# Patient Record
Sex: FEMALE | Race: WHITE | Employment: PART TIME | URBAN - METROPOLITAN AREA
[De-identification: names, ages, dates, MRNs, and addresses within clinical notes are randomized per-mention and may not be internally consistent; named-entity substitution may affect disease eponyms.]

---

## 2018-11-23 ENCOUNTER — OFFICE VISIT (OUTPATIENT)
Dept: INTERNAL MEDICINE | Age: 38
End: 2018-11-23
Payer: COMMERCIAL

## 2018-11-23 VITALS
DIASTOLIC BLOOD PRESSURE: 70 MMHG | SYSTOLIC BLOOD PRESSURE: 98 MMHG | WEIGHT: 132.6 LBS | BODY MASS INDEX: 23.5 KG/M2 | HEIGHT: 63 IN

## 2018-11-23 DIAGNOSIS — R10.9 ABDOMINAL PRESSURE: ICD-10-CM

## 2018-11-23 DIAGNOSIS — K59.00 CONSTIPATION, UNSPECIFIED CONSTIPATION TYPE: Primary | ICD-10-CM

## 2018-11-23 LAB
BILIRUBIN, POC: NORMAL
BLOOD URINE, POC: NORMAL
CLARITY, POC: NORMAL
COLOR, POC: YELLOW
GLUCOSE URINE, POC: NORMAL
KETONES, POC: NORMAL
LEUKOCYTE EST, POC: NORMAL
NITRITE, POC: NORMAL
PH, POC: NORMAL
PROTEIN, POC: NORMAL
SPECIFIC GRAVITY, POC: NORMAL
UROBILINOGEN, POC: NORMAL

## 2018-11-23 PROCEDURE — 81002 URINALYSIS NONAUTO W/O SCOPE: CPT | Performed by: NURSE PRACTITIONER

## 2018-11-23 PROCEDURE — 99213 OFFICE O/P EST LOW 20 MIN: CPT | Performed by: NURSE PRACTITIONER

## 2018-11-23 ASSESSMENT — PATIENT HEALTH QUESTIONNAIRE - PHQ9
SUM OF ALL RESPONSES TO PHQ QUESTIONS 1-9: 0
SUM OF ALL RESPONSES TO PHQ QUESTIONS 1-9: 0
1. LITTLE INTEREST OR PLEASURE IN DOING THINGS: 0
SUM OF ALL RESPONSES TO PHQ9 QUESTIONS 1 & 2: 0
2. FEELING DOWN, DEPRESSED OR HOPELESS: 0

## 2018-11-23 ASSESSMENT — ENCOUNTER SYMPTOMS
RESPIRATORY NEGATIVE: 1
ABDOMINAL PAIN: 1
NAUSEA: 1
EYES NEGATIVE: 1
DIARRHEA: 1

## 2018-11-23 NOTE — PROGRESS NOTES
Subjective:      Patient ID: Cierra Galvez is a 30 y.o. female who presents today for:  Chief Complaint   Patient presents with    Abdominal Pain     x 2 weeks, no vomitng, very nauseous, stomach pains, states in the beginning she was going to the bathroom a lot and now she cannot go dane ll, she states her stomach is very boated, states she tried a laxative and it did not help at all       - Patient tried a Suppository at home - had a small bm after, not very hard  - Before today last bm was yesterday, very small amounts  - Some mucous today in stool  - has also tried colace with minimal relief      Abdominal Pain   This is a new problem. The current episode started 1 to 4 weeks ago. Associated symptoms include diarrhea (was in the beggining) and nausea. Pertinent negatives include no fever. Constipation: now. Past Medical History:   Diagnosis Date    Asthma     seasonal     Past Surgical History:   Procedure Laterality Date    WISDOM TOOTH EXTRACTION       Social History     Social History    Marital status:      Spouse name: N/A    Number of children: N/A    Years of education: N/A     Occupational History    Not on file.      Social History Main Topics    Smoking status: Never Smoker    Smokeless tobacco: Never Used    Alcohol use Yes      Comment: rare    Drug use: Unknown    Sexual activity: Not on file     Other Topics Concern    Not on file     Social History Narrative    No narrative on file     Family History   Problem Relation Age of Onset    Asthma Mother     Other Mother         obese    High Blood Pressure Father     High Cholesterol Father     Cancer Maternal Uncle         brain    High Blood Pressure Maternal Grandfather     Cancer Paternal Grandfather         lung smoker     Allergies   Allergen Reactions    Cephalosporins     Cifenline     Shellfish-Derived Products      Current Outpatient Prescriptions on File Prior to Visit   Medication Sig Dispense Refill   

## 2018-11-25 LAB — URINE CULTURE, ROUTINE: NORMAL

## 2024-11-22 ENCOUNTER — TELEPHONE (OUTPATIENT)
Dept: INFECTIOUS DISEASES | Age: 44
End: 2024-11-22

## 2024-11-25 ENCOUNTER — OFFICE VISIT (OUTPATIENT)
Dept: INFECTIOUS DISEASES | Age: 44
End: 2024-11-25
Payer: COMMERCIAL

## 2024-11-25 VITALS
SYSTOLIC BLOOD PRESSURE: 109 MMHG | RESPIRATION RATE: 18 BRPM | WEIGHT: 142.2 LBS | TEMPERATURE: 98.2 F | DIASTOLIC BLOOD PRESSURE: 68 MMHG | BODY MASS INDEX: 25.2 KG/M2 | OXYGEN SATURATION: 99 % | HEART RATE: 62 BPM | HEIGHT: 63 IN

## 2024-11-25 DIAGNOSIS — R76.11 HISTORY OF POSITIVE PPD, UNTREATED: Primary | ICD-10-CM

## 2024-11-25 PROCEDURE — 99203 OFFICE O/P NEW LOW 30 MIN: CPT | Performed by: INTERNAL MEDICINE

## 2024-11-25 ASSESSMENT — PATIENT HEALTH QUESTIONNAIRE - PHQ9
SUM OF ALL RESPONSES TO PHQ QUESTIONS 1-9: 0
SUM OF ALL RESPONSES TO PHQ QUESTIONS 1-9: 0
SUM OF ALL RESPONSES TO PHQ9 QUESTIONS 1 & 2: 0
1. LITTLE INTEREST OR PLEASURE IN DOING THINGS: NOT AT ALL
SUM OF ALL RESPONSES TO PHQ QUESTIONS 1-9: 0
SUM OF ALL RESPONSES TO PHQ QUESTIONS 1-9: 0
2. FEELING DOWN, DEPRESSED OR HOPELESS: NOT AT ALL

## 2024-11-25 ASSESSMENT — ENCOUNTER SYMPTOMS
GASTROINTESTINAL NEGATIVE: 1
RESPIRATORY NEGATIVE: 1

## 2024-11-25 NOTE — PROGRESS NOTES
tablet by mouth daily (Patient not taking: Reported on 11/25/2024)      Flaxseed, Linseed, (FLAX SEEDS PO) Take by mouth (Patient not taking: Reported on 11/25/2024)      multivitamin (THERAGRAN) per tablet Take 1 tablet by mouth daily.   (Patient not taking: Reported on 11/25/2024)       No current facility-administered medications on file prior to visit.       Allergies   Allergen Reactions    Other Anaphylaxis     Reaction Unknown: please investigate and enter into EPIC.    Cefprozil Nausea Only    Cephalosporins     Cifenline     Shellfish Allergy Swelling     angioedema    Shellfish-Derived Products     Cephalexin Nausea And Vomiting     Reaction Unknown: please investigate and enter into EPIC.    Cephalosporins Nausea And Vomiting         Family History   Problem Relation Age of Onset    Asthma Mother     High Blood Pressure Father     High Cholesterol Father     Other Mother         obese    Cancer Maternal Uncle         brain    High Blood Pressure Maternal Grandfather     Cancer Paternal Grandfather         lung smoker         Physical Exam:      Physical Exam  Constitutional:       Appearance: She is not ill-appearing or diaphoretic.   Cardiovascular:      Heart sounds: Normal heart sounds. No murmur heard.  Pulmonary:      Effort: Pulmonary effort is normal. No respiratory distress.      Breath sounds: No wheezing, rhonchi or rales.   Abdominal:      General: Abdomen is flat. Bowel sounds are normal. There is no distension.      Palpations: Abdomen is soft. There is no mass.      Tenderness: There is no abdominal tenderness. There is no guarding.         Blood pressure 109/68, pulse 62, temperature 98.2 °F (36.8 °C), temperature source Temporal, resp. rate 18, height 1.6 m (5' 3\"), weight 64.5 kg (142 lb 3.2 oz), SpO2 99%, currently breastfeeding.      .   No results found for: \"WBC\", \"HGB\", \"HCT\", \"MCV\", \"PLT\"  No results found for: \"NA\", \"K\", \"CL\", \"CO2\", \"BUN\", \"CREATININE\", \"GLUCOSE\", \"CALCIUM\",